# Patient Record
Sex: MALE | Race: WHITE | Employment: FULL TIME | URBAN - METROPOLITAN AREA
[De-identification: names, ages, dates, MRNs, and addresses within clinical notes are randomized per-mention and may not be internally consistent; named-entity substitution may affect disease eponyms.]

---

## 2024-03-20 ENCOUNTER — HOSPITAL ENCOUNTER (EMERGENCY)
Facility: HOSPITAL | Age: 29
Discharge: HOME | End: 2024-03-20

## 2024-03-20 VITALS
SYSTOLIC BLOOD PRESSURE: 149 MMHG | OXYGEN SATURATION: 94 % | HEART RATE: 84 BPM | RESPIRATION RATE: 16 BRPM | BODY MASS INDEX: 31.07 KG/M2 | HEIGHT: 68 IN | WEIGHT: 205 LBS | TEMPERATURE: 98.1 F | DIASTOLIC BLOOD PRESSURE: 92 MMHG

## 2024-03-20 DIAGNOSIS — E11.9 DIABETES MELLITUS (HCC): Primary | ICD-10-CM

## 2024-03-20 DIAGNOSIS — R89.9 ABNORMAL LABORATORY TEST RESULT: ICD-10-CM

## 2024-03-20 DIAGNOSIS — Z91.148 NON COMPLIANCE W MEDICATION REGIMEN: ICD-10-CM

## 2024-03-20 LAB — GLUCOSE SERPL-MCNC: 345 MG/DL (ref 65–140)

## 2024-03-20 PROCEDURE — 99284 EMERGENCY DEPT VISIT MOD MDM: CPT | Performed by: PHYSICIAN ASSISTANT

## 2024-03-20 PROCEDURE — 99283 EMERGENCY DEPT VISIT LOW MDM: CPT

## 2024-03-20 PROCEDURE — 82948 REAGENT STRIP/BLOOD GLUCOSE: CPT

## 2024-03-20 NOTE — ED PROVIDER NOTES
History  Chief Complaint   Patient presents with    Abnormal Lab     Patient here for abnormal labs.  Patient is scheduled for knee surgery on April 11th and had pre surgery testing done.  Patient was called by facility and informed to go to ER for elevated WBC count, elevated Hgb A1C, elevated AST and ALT levels along with elevated glucose.      28-year-old male with past medical history significant for diabetes presents to the emergency department today with chief complaint of abnormal outpatient lab readings.  Patient reports he is scheduled for knee surgery on April 11.  He had labs performed as part of his outpatient presurgical testing.  States he received a call instructing him to go to the emergency department for elevated white blood cell count, liver function tests and glucose.  Patient reports no chest pain, shortness of breath, abdominal pain, nausea vomiting diarrhea or syncope.  He denies fevers.  He denies polyuria or polydipsia.  He states he feels well, however came in today because he was instructed to do so for further evaluation of his abnormal outpatient labs.  He does endorse that he has not been compliant with his oral diabetes medications for multiple months.  He does endorse not following a diabetic diet.       History provided by:  Patient   used: No        None       Past Medical History:   Diagnosis Date    Diabetes (HCC)        Past Surgical History:   Procedure Laterality Date    KNEE SURGERY      left    SHOULDER SURGERY      left       History reviewed. No pertinent family history.  I have reviewed and agree with the history as documented.    E-Cigarette/Vaping    E-Cigarette Use Current Every Day User      E-Cigarette/Vaping Substances    Flavoring Yes      Social History     Tobacco Use    Smoking status: Never    Smokeless tobacco: Never   Vaping Use    Vaping status: Every Day    Substances: Flavoring   Substance Use Topics    Alcohol use: Never    Drug use:  Yes     Types: Marijuana       Review of Systems   Constitutional:  Negative for fever.   Respiratory:  Negative for chest tightness, shortness of breath and stridor.    Cardiovascular:  Negative for chest pain.   Musculoskeletal:  Negative for gait problem.   Skin:  Negative for rash.   Neurological:  Negative for seizures, syncope, facial asymmetry and numbness.   All other systems reviewed and are negative.      Physical Exam  Physical Exam  Vitals and nursing note reviewed.   Constitutional:       General: He is not in acute distress.     Appearance: Normal appearance.   HENT:      Head: Normocephalic and atraumatic.      Right Ear: External ear normal.      Left Ear: External ear normal.      Nose: Nose normal.   Eyes:      General: No scleral icterus.        Right eye: No discharge.         Left eye: No discharge.   Cardiovascular:      Rate and Rhythm: Normal rate.      Pulses: Normal pulses.   Pulmonary:      Effort: Pulmonary effort is normal.      Breath sounds: Normal breath sounds.   Musculoskeletal:         General: No tenderness, deformity or signs of injury.      Cervical back: Normal range of motion and neck supple.   Skin:     General: Skin is dry.      Coloration: Skin is not jaundiced.      Findings: No erythema or rash.   Neurological:      General: No focal deficit present.      Mental Status: He is alert and oriented to person, place, and time. Mental status is at baseline.      Motor: No weakness.      Gait: Gait normal.   Psychiatric:         Mood and Affect: Mood normal.         Behavior: Behavior normal.         Thought Content: Thought content normal.         Vital Signs  ED Triage Vitals [03/20/24 1014]   Temperature Pulse Respirations Blood Pressure SpO2   98.1 °F (36.7 °C) (!) 114 16 149/92 94 %      Temp Source Heart Rate Source Patient Position - Orthostatic VS BP Location FiO2 (%)   Oral Monitor Sitting Left arm --      Pain Score       --           Vitals:    03/20/24 1014 03/20/24  1102   BP: 149/92    Pulse: (!) 114 84   Patient Position - Orthostatic VS: Sitting          Visual Acuity      ED Medications  Medications - No data to display    Diagnostic Studies  Results Reviewed       Procedure Component Value Units Date/Time    Fingerstick Glucose (POCT) [[id number]]  (Abnormal) Collected: 03/20/24 1048    Lab Status: Final result Specimen: Blood Updated: 03/20/24 1050     POC Glucose 345 mg/dl                    No orders to display              Procedures  Procedures         ED Course                                             Medical Decision Making  MDM:     Based on my history and physical examination, I considered the following life or limb threatening disease(s) in my Differential Diagnosis: Suspect medication noncompliance causing abnormal labs, consider DKA, HHS, low suspicion for infection, ACS, renal failure    Patient has been medically screened for potential limb- or life-threatening conditions that may lead to permanent organ injury or dysfunctionan. Initial history and clinical exam findings raise concern for an acute emergent process.  Patient has no indication for advanced imaging.     Based on my history and clinical evaluation I will order the appropriate diagnostic testing to narrow my differential diagnosis and arrive at a final diagnosis. I have reviewed the patient's vital signs, nursing notes, and other relevant ancillary testing/information. I have had a detailed discussion with the patient regarding the historical points, examination findings, and any diagnostic results    Final Assessment (see ED course for additional MDM): Uncontrolled type 2 diabetes causing hyperglycemia secondary to medication noncompliance.  Abnormal outpatient labs including mild transaminitis without obstructive pattern.  Elevated white blood cell count is nonspecific and likely related to elevated glucose levels.  Patient is asymptomatic, reports no symptoms and has a normal clinical exam.   No indication for additional workup.  Discussed importance of glucose control to avoid short and long-term adverse effects including cardiac disease, renal disease, infections and other diabetic complications.  Vital signs stable.  Heart rate initially elevated at 114, improved to 84 on recheck.  Instructed f/u with pcp and endocrinologist for further treatment.  Instructed to restart metformin for better diabetes control.  Stable for discharge and outpatient follow-up.  Return to ED precautions given      Risk  Prescription drug management.             Disposition  Final diagnoses:   Diabetes mellitus (HCC)   Abnormal laboratory test result   Non compliance w medication regimen     Time reflects when diagnosis was documented in both MDM as applicable and the Disposition within this note       Time User Action Codes Description Comment    3/20/2024 10:47 AM Luiz Villalta [E11.9] Diabetes mellitus (HCC)     3/20/2024 10:47 AM Luiz Villalta [R89.9] Abnormal laboratory test result     3/20/2024 10:47 AM Luiz Villalta [Z91.148] Non compliance w medication regimen           ED Disposition       ED Disposition   Discharge    Condition   Stable    Date/Time   Wed Mar 20, 2024 10:46 AM    Comment   Jorge Benoit discharge to home/self care.                   Follow-up Information       Follow up With Specialties Details Why Contact Info Additional Information    [facility] Emergency Department Emergency Medicine Go to  If symptoms worsen [address] [facility] [address] [phone] [facility] Emergency Department, [address], [place], [address]    Octavio Dey MD Family Medicine Call in 1 week for further evaluation of symptoms [address]  [facility] [address] [phone]       [facility] Call in 1 week for further evaluation and management of diabetes  [address]  [facility] [address]  [phone] [facility] [address], [address], [place] [address], [phone]            Discharge Medication List as of 3/20/2024 10:48 AM        START taking these medications    Details   metFORMIN (GLUCOPHAGE) 1000 MG tablet Take 1 tablet (1,000 mg total) by mouth 2 (two) times a day with meals, Starting Wed 3/20/2024, Until Fri 4/19/2024, Normal             No discharge procedures on file.    PDMP Review       None            ED Provider  Electronically Signed by             Luiz Villalta PA-C  03/23/24 1550

## 2024-10-08 ENCOUNTER — APPOINTMENT (EMERGENCY)
Dept: RADIOLOGY | Facility: HOSPITAL | Age: 29
End: 2024-10-08

## 2024-10-08 ENCOUNTER — HOSPITAL ENCOUNTER (EMERGENCY)
Facility: HOSPITAL | Age: 29
Discharge: HOME | End: 2024-10-08
Attending: EMERGENCY MEDICINE

## 2024-10-08 VITALS
HEART RATE: 101 BPM | BODY MASS INDEX: 32.08 KG/M2 | RESPIRATION RATE: 20 BRPM | WEIGHT: 211.64 LBS | SYSTOLIC BLOOD PRESSURE: 146 MMHG | HEIGHT: 68 IN | OXYGEN SATURATION: 98 % | DIASTOLIC BLOOD PRESSURE: 82 MMHG | TEMPERATURE: 97.9 F

## 2024-10-08 DIAGNOSIS — R05.1 ACUTE COUGH: ICD-10-CM

## 2024-10-08 DIAGNOSIS — J20.9 ACUTE BRONCHITIS: Primary | ICD-10-CM

## 2024-10-08 DIAGNOSIS — R06.2 WHEEZING: ICD-10-CM

## 2024-10-08 DIAGNOSIS — R06.02 SOB (SHORTNESS OF BREATH): ICD-10-CM

## 2024-10-08 LAB
FLUAV AG UPPER RESP QL IA.RAPID: NEGATIVE
FLUBV AG UPPER RESP QL IA.RAPID: NEGATIVE
SARS-COV+SARS-COV-2 AG RESP QL IA.RAPID: NEGATIVE

## 2024-10-08 PROCEDURE — 99285 EMERGENCY DEPT VISIT HI MDM: CPT

## 2024-10-08 PROCEDURE — 87811 SARS-COV-2 COVID19 W/OPTIC: CPT | Performed by: EMERGENCY MEDICINE

## 2024-10-08 PROCEDURE — 87804 INFLUENZA ASSAY W/OPTIC: CPT | Performed by: EMERGENCY MEDICINE

## 2024-10-08 PROCEDURE — 71045 X-RAY EXAM CHEST 1 VIEW: CPT

## 2024-10-08 PROCEDURE — 99284 EMERGENCY DEPT VISIT MOD MDM: CPT | Performed by: EMERGENCY MEDICINE

## 2024-10-08 PROCEDURE — 93005 ELECTROCARDIOGRAM TRACING: CPT

## 2024-10-08 RX ORDER — PREDNISONE 20 MG/1
60 TABLET ORAL ONCE
Status: COMPLETED | OUTPATIENT
Start: 2024-10-08 | End: 2024-10-08

## 2024-10-08 RX ORDER — PREDNISONE 20 MG/1
60 TABLET ORAL DAILY
Qty: 9 TABLET | Refills: 0 | Status: SHIPPED | OUTPATIENT
Start: 2024-10-10 | End: 2024-10-13

## 2024-10-08 RX ORDER — ALBUTEROL SULFATE 0.83 MG/ML
5 SOLUTION RESPIRATORY (INHALATION) ONCE
Status: COMPLETED | OUTPATIENT
Start: 2024-10-08 | End: 2024-10-08

## 2024-10-08 RX ORDER — ALBUTEROL SULFATE 90 UG/1
2 INHALANT RESPIRATORY (INHALATION) EVERY 6 HOURS PRN
Qty: 18 G | Refills: 0 | Status: SHIPPED | OUTPATIENT
Start: 2024-10-08

## 2024-10-08 RX ADMIN — ALBUTEROL SULFATE 5 MG: 2.5 SOLUTION RESPIRATORY (INHALATION) at 18:11

## 2024-10-08 RX ADMIN — PREDNISONE 60 MG: 20 TABLET ORAL at 18:11

## 2024-10-08 RX ADMIN — IPRATROPIUM BROMIDE 0.5 MG: 0.5 SOLUTION RESPIRATORY (INHALATION) at 18:11

## 2024-10-08 NOTE — ED PROVIDER NOTES
Final diagnoses:   Acute bronchitis   SOB (shortness of breath)   Wheezing   Acute cough     ED Disposition       ED Disposition   Discharge    Condition   Stable    Date/Time   Tue Oct 8, 2024  6:50 PM    Comment   Jorge Benoit discharge to home/self care.                   Assessment & Plan       Medical Decision Making  29-year-old male history of diabetes presenting with viral syndrome.  Suspect likely bronchitis.  Given breathing treatment and steroids with significant improvement.  Chest x-ray interpreted by me without significant acute process.  Prescription sent to pharmacy. Discussed results and recommendations. Advised follow up PCP. Medication recommendations. Given instructions and return precautions. Patient/family at bedside acknowledged understanding of all written and verbal instructions and return precautions. Discharged.     Amount and/or Complexity of Data Reviewed  Labs: ordered.  Radiology: ordered.    Risk  Prescription drug management.             Medications   albuterol inhalation solution 5 mg (5 mg Nebulization Given 10/8/24 1811)   ipratropium (ATROVENT) 0.02 % inhalation solution 0.5 mg (0.5 mg Nebulization Given 10/8/24 1811)   predniSONE tablet 60 mg (60 mg Oral Given 10/8/24 1811)       ED Risk Strat Scores                           SBIRT 22yo+      Flowsheet Row Most Recent Value   Initial Alcohol Screen: US AUDIT-C     1. How often do you have a drink containing alcohol? 0 Filed at: 10/08/2024 1800   2. How many drinks containing alcohol do you have on a typical day you are drinking?  0 Filed at: 10/08/2024 1800   3a. Male UNDER 65: How often do you have five or more drinks on one occasion? 0 Filed at: 10/08/2024 1800   3b. FEMALE Any Age, or MALE 65+: How often do you have 4 or more drinks on one occassion? 0 Filed at: 10/08/2024 1800   Audit-C Score 0 Filed at: 10/08/2024 1800   JOSE EDUARDO: How many times in the past year have you...    Used an illegal drug or used a  prescription medication for non-medical reasons? Never Filed at: 10/08/2024 1800                            History of Present Illness       Chief Complaint   Patient presents with    Shortness of Breath     SOB x1 week, worse at night, coughing. Reports recent viral illness. Pt reports being diabetic with controlled sugars       Past Medical History:   Diagnosis Date    Diabetes (HCC)     Diabetes mellitus (HCC)       Past Surgical History:   Procedure Laterality Date    KNEE SURGERY      left    SHOULDER SURGERY      left      History reviewed. No pertinent family history.   Social History     Tobacco Use    Smoking status: Never    Smokeless tobacco: Never   Vaping Use    Vaping status: Every Day    Substances: Nicotine, THC, Flavoring   Substance Use Topics    Alcohol use: Never    Drug use: Yes     Types: Marijuana      E-Cigarette/Vaping    E-Cigarette Use Current Every Day User       E-Cigarette/Vaping Substances    Nicotine Yes     THC Yes     Flavoring Yes       I have reviewed and agree with the history as documented.     29-year-old male history of diabetes presenting with viral syndrome.  Patient reports 1 week of symptoms including congestion, runny nose, nonproductive cough.  Patient here due to persistent cough and shortness of breath wheezing.  Denies any chest pain.  Denies any dental pain nausea vomit diarrhea.  Denies any other complaints.  Chart reviewed.    Past Medical History:  No date: Diabetes (HCC)  No date: Diabetes mellitus (HCC)  Family History: non-contributory  Social History          Review of Systems   Constitutional:  Negative for appetite change, chills, diaphoresis, fever and unexpected weight change.   HENT:  Positive for congestion and rhinorrhea.    Eyes:  Negative for photophobia and visual disturbance.   Respiratory:  Positive for cough, shortness of breath and wheezing. Negative for chest tightness.    Cardiovascular:  Negative for chest pain, palpitations and leg swelling.    Gastrointestinal:  Negative for abdominal distention, abdominal pain, blood in stool, constipation, diarrhea, nausea and vomiting.   Genitourinary:  Negative for dysuria and hematuria.   Musculoskeletal:  Negative for back pain, joint swelling, neck pain and neck stiffness.   Skin:  Negative for color change, pallor, rash and wound.   Neurological:  Negative for dizziness, syncope, weakness, light-headedness and headaches.   Psychiatric/Behavioral:  Negative for agitation.    All other systems reviewed and are negative.          Objective       ED Triage Vitals [10/08/24 1758]   Temperature Pulse Blood Pressure Respirations SpO2 Patient Position - Orthostatic VS   97.9 °F (36.6 °C) (!) 109 147/92 19 96 % Sitting      Temp Source Heart Rate Source BP Location FiO2 (%) Pain Score    Oral Monitor Left arm -- --      Vitals      Date and Time Temp Pulse SpO2 Resp BP Pain Score FACES Pain Rating User   10/08/24 1845 -- 101 98 % 20 146/82 -- -- AM   10/08/24 1815 -- 107 98 % 14 161/83 -- -- AM   10/08/24 1758 97.9 °F (36.6 °C) 109 96 % 19 147/92 -- -- AB            Physical Exam  Vitals and nursing note reviewed.   Constitutional:       General: He is not in acute distress.     Appearance: Normal appearance. He is well-developed. He is not ill-appearing, toxic-appearing or diaphoretic.   HENT:      Head: Normocephalic and atraumatic.      Nose: Nose normal. No congestion or rhinorrhea.      Mouth/Throat:      Mouth: Mucous membranes are moist.      Pharynx: Oropharynx is clear. No oropharyngeal exudate or posterior oropharyngeal erythema.   Eyes:      General: No scleral icterus.        Right eye: No discharge.         Left eye: No discharge.      Extraocular Movements: Extraocular movements intact.      Conjunctiva/sclera: Conjunctivae normal.      Pupils: Pupils are equal, round, and reactive to light.   Neck:      Vascular: No JVD.      Trachea: No tracheal deviation.      Comments: Supple. Normal range of motion.    Cardiovascular:      Rate and Rhythm: Normal rate and regular rhythm.      Heart sounds: Normal heart sounds. No murmur heard.     No friction rub. No gallop.      Comments: Normal rate and regular rhythm  Pulmonary:      Effort: Pulmonary effort is normal. No respiratory distress.      Breath sounds: No stridor. Examination of the right-upper field reveals wheezing. Examination of the left-upper field reveals wheezing. Examination of the right-middle field reveals wheezing. Examination of the left-middle field reveals wheezing. Examination of the right-lower field reveals wheezing. Examination of the left-lower field reveals wheezing. Wheezing present. No rales.      Comments: Diffuse expiratory wheezing  Chest:      Chest wall: No tenderness.   Abdominal:      General: Bowel sounds are normal. There is no distension.      Palpations: Abdomen is soft.      Tenderness: There is no abdominal tenderness. There is no right CVA tenderness, left CVA tenderness, guarding or rebound.      Comments: Soft, nontender, nondistended.  Normal bowel sounds throughout   Musculoskeletal:         General: No swelling, tenderness, deformity or signs of injury. Normal range of motion.      Cervical back: Normal range of motion and neck supple. No rigidity. No muscular tenderness.      Right lower leg: No edema.      Left lower leg: No edema.   Lymphadenopathy:      Cervical: No cervical adenopathy.   Skin:     General: Skin is warm and dry.      Coloration: Skin is not pale.      Findings: No erythema or rash.   Neurological:      General: No focal deficit present.      Mental Status: He is alert. Mental status is at baseline.      Sensory: No sensory deficit.      Motor: No weakness or abnormal muscle tone.      Coordination: Coordination normal.      Gait: Gait normal.      Comments: Alert.  Strength and sensation grossly intact.  Ambulatory without difficulty at baseline.    Psychiatric:         Behavior: Behavior normal.          Thought Content: Thought content normal.         Results Reviewed       Procedure Component Value Units Date/Time    FLU/COVID Rapid Antigen (30 min. TAT) - Preferred screening test in ED [[id number]]  (Normal) Collected: 10/08/24 1802    Lab Status: Final result Specimen: Nares from Nose Updated: 10/08/24 1822     SARS COV Rapid Antigen Negative     Influenza A Rapid Antigen Negative     Influenza B Rapid Antigen Negative    Narrative:      This test has been performed using the [employer] 2 FLU+SARS Antigen test under the Emergency Use Authorization (EUA). This test has been validated by the  and verified by the performing laboratory. The Paulina uses lateral flow immunofluorescent sandwich assay to detect SARS-COV, Influenza A and Influenza B Antigen.     The [employer] Paulina 2 SARS Antigen test does not differentiate between SARS-CoV and SARS-CoV-2.     Negative results are presumptive and may be confirmed with a molecular assay, if necessary, for patient management. Negative results do not rule out SARS-CoV-2 or influenza infection and should not be used as the sole basis for treatment or patient management decisions. A negative test result may occur if the level of antigen in a sample is below the limit of detection of this test.     Positive results are indicative of the presence of viral antigens, but do not rule out bacterial infection or co-infection with other viruses.     All test results should be used as an adjunct to clinical observations and other information available to the provider.    FOR PEDIATRIC PATIENTS - copy/paste COVID Guidelines URL to browser: [email or web address]            XR chest 1 view portable    (Results Pending)       Procedures    ED Medication and Procedure Management   Prior to Admission Medications   Prescriptions Last Dose Informant Patient Reported? Taking?   metFORMIN (GLUCOPHAGE) 1000 MG tablet   No No   Sig:  Take 1 tablet (1,000 mg total) by mouth 2 (two) times a day with meals      Facility-Administered Medications: None     Discharge Medication List as of 10/8/2024  6:51 PM        START taking these medications    Details   albuterol (ProAir HFA) 90 mcg/act inhaler Inhale 2 puffs every 6 (six) hours as needed for shortness of breath or wheezing, Starting Tue 10/8/2024, Normal      predniSONE 20 mg tablet Take 3 tablets (60 mg total) by mouth daily for 3 days Do not start before October 10, 2024., Starting Thu 10/10/2024, Until Sun 10/13/2024, Normal           CONTINUE these medications which have NOT CHANGED    Details   metFORMIN (GLUCOPHAGE) 1000 MG tablet Take 1 tablet (1,000 mg total) by mouth 2 (two) times a day with meals, Starting Wed 3/20/2024, Until Fri 4/19/2024, Normal           No discharge procedures on file.  ED SEPSIS DOCUMENTATION   Time reflects when diagnosis was documented in both MDM as applicable and the Disposition within this note       Time User Action Codes Description Comment    10/8/2024  6:51 PM Jt Robertson Add [J20.9] Acute bronchitis     10/8/2024  6:51 PM Jt Robertson Add [R06.02] SOB (shortness of breath)     10/8/2024  6:51 PM Jt Robertson Add [R06.2] Wheezing     10/8/2024  6:51 PM Jt Robertson Add [R05.1] Acute cough                  Jt Robertson MD  10/08/24 1957

## 2024-10-09 LAB
ATRIAL RATE: 120 BPM
P AXIS: 67 DEGREES
PR INTERVAL: 124 MS
QRS AXIS: 77 DEGREES
QRSD INTERVAL: 82 MS
QT INTERVAL: 318 MS
QTC INTERVAL: 449 MS
T WAVE AXIS: 65 DEGREES
VENTRICULAR RATE: 120 BPM

## 2024-10-09 PROCEDURE — 93010 ELECTROCARDIOGRAM REPORT: CPT | Performed by: INTERNAL MEDICINE

## 2025-01-20 ENCOUNTER — HOSPITAL ENCOUNTER (EMERGENCY)
Facility: HOSPITAL | Age: 30
Discharge: HOME | End: 2025-01-20
Attending: EMERGENCY MEDICINE

## 2025-01-20 ENCOUNTER — APPOINTMENT (EMERGENCY)
Dept: RADIOLOGY | Facility: HOSPITAL | Age: 30
End: 2025-01-20

## 2025-01-20 VITALS
OXYGEN SATURATION: 96 % | SYSTOLIC BLOOD PRESSURE: 143 MMHG | DIASTOLIC BLOOD PRESSURE: 88 MMHG | WEIGHT: 216.05 LBS | HEART RATE: 105 BPM | TEMPERATURE: 97.6 F | RESPIRATION RATE: 20 BRPM | BODY MASS INDEX: 32.85 KG/M2

## 2025-01-20 DIAGNOSIS — J18.9 PNEUMONIA: Primary | ICD-10-CM

## 2025-01-20 LAB
ALBUMIN SERPL BCG-MCNC: 4.3 G/DL (ref 3.5–5)
ALP SERPL-CCNC: 155 U/L (ref 34–104)
ALT SERPL W P-5'-P-CCNC: 37 U/L (ref 7–52)
ANION GAP SERPL CALCULATED.3IONS-SCNC: 9 MMOL/L (ref 4–13)
AST SERPL W P-5'-P-CCNC: 20 U/L (ref 13–39)
BASOPHILS # BLD AUTO: 0.11 THOUSANDS/ΜL (ref 0–0.1)
BASOPHILS NFR BLD AUTO: 1 % (ref 0–1)
BILIRUB SERPL-MCNC: 0.45 MG/DL (ref 0.2–1)
BUN SERPL-MCNC: 12 MG/DL (ref 5–25)
CALCIUM SERPL-MCNC: 9.1 MG/DL (ref 8.4–10.2)
CARDIAC TROPONIN I PNL SERPL HS: <2 NG/L (ref ?–50)
CHLORIDE SERPL-SCNC: 99 MMOL/L (ref 96–108)
CO2 SERPL-SCNC: 26 MMOL/L (ref 21–32)
CREAT SERPL-MCNC: 0.8 MG/DL (ref 0.6–1.3)
D DIMER PPP FEU-MCNC: <0.27 UG/ML FEU
EOSINOPHIL # BLD AUTO: 0.88 THOUSAND/ΜL (ref 0–0.61)
EOSINOPHIL NFR BLD AUTO: 5 % (ref 0–6)
ERYTHROCYTE [DISTWIDTH] IN BLOOD BY AUTOMATED COUNT: 14.1 % (ref 11.6–15.1)
FLUAV AG UPPER RESP QL IA.RAPID: NEGATIVE
FLUBV AG UPPER RESP QL IA.RAPID: NEGATIVE
GFR SERPL CREATININE-BSD FRML MDRD: 120 ML/MIN/1.73SQ M
GLUCOSE SERPL-MCNC: 324 MG/DL (ref 65–140)
HCT VFR BLD AUTO: 43.8 % (ref 36.5–49.3)
HGB BLD-MCNC: 14.1 G/DL (ref 12–17)
IMM GRANULOCYTES # BLD AUTO: 0.1 THOUSAND/UL (ref 0–0.2)
IMM GRANULOCYTES NFR BLD AUTO: 1 % (ref 0–2)
LYMPHOCYTES # BLD AUTO: 5.77 THOUSANDS/ΜL (ref 0.6–4.47)
LYMPHOCYTES NFR BLD AUTO: 32 % (ref 14–44)
MCH RBC QN AUTO: 24.5 PG (ref 26.8–34.3)
MCHC RBC AUTO-ENTMCNC: 32.2 G/DL (ref 31.4–37.4)
MCV RBC AUTO: 76 FL (ref 82–98)
MONOCYTES # BLD AUTO: 0.96 THOUSAND/ΜL (ref 0.17–1.22)
MONOCYTES NFR BLD AUTO: 5 % (ref 4–12)
NEUTROPHILS # BLD AUTO: 10.36 THOUSANDS/ΜL (ref 1.85–7.62)
NEUTS SEG NFR BLD AUTO: 56 % (ref 43–75)
NRBC BLD AUTO-RTO: 0 /100 WBCS
PLATELET # BLD AUTO: 451 THOUSANDS/UL (ref 149–390)
PMV BLD AUTO: 9.5 FL (ref 8.9–12.7)
POTASSIUM SERPL-SCNC: 3.6 MMOL/L (ref 3.5–5.3)
PROT SERPL-MCNC: 7.7 G/DL (ref 6.4–8.4)
RBC # BLD AUTO: 5.76 MILLION/UL (ref 3.88–5.62)
SARS-COV+SARS-COV-2 AG RESP QL IA.RAPID: NEGATIVE
SODIUM SERPL-SCNC: 134 MMOL/L (ref 135–147)
WBC # BLD AUTO: 18.18 THOUSAND/UL (ref 4.31–10.16)

## 2025-01-20 PROCEDURE — 87811 SARS-COV-2 COVID19 W/OPTIC: CPT | Performed by: EMERGENCY MEDICINE

## 2025-01-20 PROCEDURE — 71046 X-RAY EXAM CHEST 2 VIEWS: CPT

## 2025-01-20 PROCEDURE — 94640 AIRWAY INHALATION TREATMENT: CPT

## 2025-01-20 PROCEDURE — 87804 INFLUENZA ASSAY W/OPTIC: CPT | Performed by: EMERGENCY MEDICINE

## 2025-01-20 PROCEDURE — 36415 COLL VENOUS BLD VENIPUNCTURE: CPT | Performed by: EMERGENCY MEDICINE

## 2025-01-20 PROCEDURE — 85025 COMPLETE CBC W/AUTO DIFF WBC: CPT | Performed by: EMERGENCY MEDICINE

## 2025-01-20 PROCEDURE — 93005 ELECTROCARDIOGRAM TRACING: CPT

## 2025-01-20 PROCEDURE — 80053 COMPREHEN METABOLIC PANEL: CPT | Performed by: EMERGENCY MEDICINE

## 2025-01-20 PROCEDURE — 99285 EMERGENCY DEPT VISIT HI MDM: CPT | Performed by: EMERGENCY MEDICINE

## 2025-01-20 PROCEDURE — 99285 EMERGENCY DEPT VISIT HI MDM: CPT

## 2025-01-20 PROCEDURE — 84484 ASSAY OF TROPONIN QUANT: CPT | Performed by: EMERGENCY MEDICINE

## 2025-01-20 PROCEDURE — 85379 FIBRIN DEGRADATION QUANT: CPT | Performed by: EMERGENCY MEDICINE

## 2025-01-20 RX ORDER — ALBUTEROL SULFATE 90 UG/1
2 INHALANT RESPIRATORY (INHALATION) ONCE
Status: COMPLETED | OUTPATIENT
Start: 2025-01-20 | End: 2025-01-20

## 2025-01-20 RX ORDER — IPRATROPIUM BROMIDE AND ALBUTEROL SULFATE 2.5; .5 MG/3ML; MG/3ML
3 SOLUTION RESPIRATORY (INHALATION) ONCE
Status: COMPLETED | OUTPATIENT
Start: 2025-01-20 | End: 2025-01-20

## 2025-01-20 RX ORDER — AMOXICILLIN 250 MG/1
1000 CAPSULE ORAL ONCE
Status: COMPLETED | OUTPATIENT
Start: 2025-01-20 | End: 2025-01-20

## 2025-01-20 RX ORDER — AMOXICILLIN 500 MG/1
1000 CAPSULE ORAL EVERY 8 HOURS SCHEDULED
Qty: 30 CAPSULE | Refills: 0 | Status: SHIPPED | OUTPATIENT
Start: 2025-01-20 | End: 2025-01-25

## 2025-01-20 RX ADMIN — ALBUTEROL SULFATE 2 PUFF: 90 AEROSOL, METERED RESPIRATORY (INHALATION) at 22:14

## 2025-01-20 RX ADMIN — AMOXICILLIN 1000 MG: 250 CAPSULE ORAL at 22:56

## 2025-01-20 RX ADMIN — IPRATROPIUM BROMIDE AND ALBUTEROL SULFATE 3 ML: 2.5; .5 SOLUTION RESPIRATORY (INHALATION) at 22:14

## 2025-01-21 LAB
ATRIAL RATE: 97 BPM
P AXIS: 81 DEGREES
PR INTERVAL: 130 MS
QRS AXIS: 86 DEGREES
QRSD INTERVAL: 84 MS
QT INTERVAL: 342 MS
QTC INTERVAL: 434 MS
T WAVE AXIS: 60 DEGREES
VENTRICULAR RATE: 97 BPM

## 2025-01-21 PROCEDURE — 93010 ELECTROCARDIOGRAM REPORT: CPT | Performed by: INTERNAL MEDICINE

## 2025-01-21 NOTE — ED PROVIDER NOTES
Time reflects when diagnosis was documented in both MDM as applicable and the Disposition within this note       Time User Action Codes Description Comment    1/20/2025 10:45 PM Brook Doherty [J18.9] Pneumonia           ED Disposition       ED Disposition   Discharge    Condition   Stable    Date/Time   Mon Jan 20, 2025 10:45 PM    Comment   Jorge Benoit discharge to home/self care.                   Assessment & Plan       Medical Decision Making  Patient is a 29-year-old male past medical history diabetes presenting with shortness of breath.  Patient is well-appearing at bedside with stable vitals and in no acute distress.  He has mild expiratory wheezing in the right base, with no signs of respiratory distress, no lower extreme edema, equal pulses and no other significant physical exam findings.  Will give albuterol, obtain chest x-ray to assess for pneumonia, pneumothorax, pulmonary edema, labs to assess for electrolyte abnormalities, anemia, MATEUSZ, D-dimer to assess for pulmonary embolism and continue to monitor.    Amount and/or Complexity of Data Reviewed  Labs: ordered.  Radiology: ordered and independent interpretation performed.    Risk  Prescription drug management.        ED Course as of 01/24/25 1722   Mon Jan 20, 2025   2244 Patient with right lower lobe pneumonia, will give antibiotics, will give outpatient albuterol inhaler and have discussed return precautions and patient states he understands.       Medications   albuterol (PROVENTIL HFA,VENTOLIN HFA) inhaler 2 puff (2 puffs Inhalation Given 1/20/25 2214)   ipratropium-albuterol (DUO-NEB) 0.5-2.5 mg/3 mL inhalation solution 3 mL (3 mL Nebulization Given 1/20/25 2214)   amoxicillin (AMOXIL) capsule 1,000 mg (1,000 mg Oral Given 1/20/25 2256)       ED Risk Strat Scores                          SBIRT 22yo+      Flowsheet Row Most Recent Value   Initial Alcohol Screen: US AUDIT-C     1. How often do you have a drink containing  alcohol? 0 Filed at: 01/20/2025 2108   2. How many drinks containing alcohol do you have on a typical day you are drinking?  0 Filed at: 01/20/2025 2108   3a. Male UNDER 65: How often do you have five or more drinks on one occasion? 0 Filed at: 01/20/2025 2108   Audit-C Score 0 Filed at: 01/20/2025 2108   JOSE EDUARDO: How many times in the past year have you...    Used an illegal drug or used a prescription medication for non-medical reasons? Never Filed at: 01/20/2025 2108                            History of Present Illness       Chief Complaint   Patient presents with    Shortness of Breath     Pt c/o SOB since yesterday. Pt states that tonight it is worse. He was seen for same a couple months ago, was told to come back if he does not feel better, and states that he received an inhaler which provided relief and would like another to help. Denies Hx of asthma.        Past Medical History:   Diagnosis Date    Diabetes (HCC)     Diabetes mellitus (HCC)       Past Surgical History:   Procedure Laterality Date    KNEE SURGERY      left    SHOULDER SURGERY      left      History reviewed. No pertinent family history.   Social History     Tobacco Use    Smoking status: Never    Smokeless tobacco: Never   Vaping Use    Vaping status: Every Day    Substances: Nicotine, THC, Flavoring   Substance Use Topics    Alcohol use: Never    Drug use: Yes     Types: Marijuana      E-Cigarette/Vaping    E-Cigarette Use Current Every Day User       E-Cigarette/Vaping Substances    Nicotine Yes     THC Yes     Flavoring Yes       I have reviewed and agree with the history as documented.     Patient is a 29-year-old male past medical history of diabetes presenting with shortness of breath.  Patient notes shortness of breath for the last 1 to 2 days and states he had similar several months ago when he was diagnosed with bronchitis.  States he has a cough productive of green nonbloody sputum which is worse at night.  He states he was given an  inhaler several months ago and that has helped.  Notes 1 episode of vomiting but denies any chest pain, leg pain or swelling, nausea, rashes, vision changes, dysuria, dizziness, fevers.  Denies any history of blood clots, clotting disorders, cancer diagnosis, recent surgeries or immobilization or use of estrogen products.        Review of Systems   All other systems reviewed and are negative.          Objective       ED Triage Vitals [01/20/25 2104]   Temperature Pulse Blood Pressure Respirations SpO2 Patient Position - Orthostatic VS   97.6 °F (36.4 °C) 105 143/88 20 96 % Sitting      Temp Source Heart Rate Source BP Location FiO2 (%) Pain Score    Temporal Monitor Left arm -- --      Vitals      Date and Time Temp Pulse SpO2 Resp BP Pain Score FACES Pain Rating User   01/20/25 2104 97.6 °F (36.4 °C) 105 96 % 20 143/88 -- -- BS            Physical Exam  Vitals reviewed.   Constitutional:       General: He is not in acute distress.     Appearance: Normal appearance. He is not ill-appearing.   HENT:      Mouth/Throat:      Mouth: Mucous membranes are moist.   Eyes:      Conjunctiva/sclera: Conjunctivae normal.   Cardiovascular:      Rate and Rhythm: Normal rate and regular rhythm.      Pulses: Normal pulses.      Heart sounds: Normal heart sounds.   Pulmonary:      Effort: Pulmonary effort is normal.      Breath sounds: Examination of the right-lower field reveals wheezing. Wheezing present.      Comments: Mild expiratory wheezing in the lower lung fields bilaterally  Abdominal:      General: Abdomen is flat.      Palpations: Abdomen is soft.      Tenderness: There is no abdominal tenderness.   Musculoskeletal:         General: No swelling. Normal range of motion.      Cervical back: Neck supple.      Right lower leg: No tenderness. No edema.      Left lower leg: No tenderness. No edema.   Skin:     General: Skin is warm and dry.   Neurological:      General: No focal deficit present.      Mental Status: He is alert.    Psychiatric:         Mood and Affect: Mood normal.         Results Reviewed       Procedure Component Value Units Date/Time    HS Troponin 0hr (reflex protocol) [[id number]]  (Normal) Collected: 01/20/25 2211    Lab Status: Final result Specimen: Blood from Arm, Right Updated: 01/20/25 2240     hs TnI 0hr <2 ng/L     D-Dimer [[id number]]  (Normal) Collected: 01/20/25 2211    Lab Status: Final result Specimen: Blood from Arm, Right Updated: 01/20/25 2238     D-Dimer, Quant <0.27 ug/ml FEU     FLU/COVID Rapid Antigen (30 min. TAT) - Preferred screening test in ED [[id number]]  (Normal) Collected: 01/20/25 2211    Lab Status: Final result Specimen: Nares from Nose Updated: 01/20/25 2234     SARS COV Rapid Antigen Negative     Influenza A Rapid Antigen Negative     Influenza B Rapid Antigen Negative    Narrative:      This test has been performed using the [employer] 2 FLU+SARS Antigen test under the Emergency Use Authorization (EUA). This test has been validated by the  and verified by the performing laboratory. The Paul[employer] uses lateral flow immunofluorescent sandwich assay to detect SARS-COV, Influenza A and Influenza B Antigen.     The [employer] Paul[employer] 2 SARS Antigen test does not differentiate between SARS-CoV and SARS-CoV-2.     Negative results are presumptive and may be confirmed with a molecular assay, if necessary, for patient management. Negative results do not rule out SARS-CoV-2 or influenza infection and should not be used as the sole basis for treatment or patient management decisions. A negative test result may occur if the level of antigen in a sample is below the limit of detection of this test.     Positive results are indicative of the presence of viral antigens, but do not rule out bacterial infection or co-infection with other viruses.     All test results should be used as an adjunct to clinical observations and other information available to the provider.    FOR PEDIATRIC PATIENTS -  copy/paste COVID Guidelines URL to browser: [email or web address]    Comprehensive metabolic panel [[id number]]  (Abnormal) Collected: 01/20/25 2211    Lab Status: Final result Specimen: Blood from Arm, Right Updated: 01/20/25 2233     Sodium 134 mmol/L      Potassium 3.6 mmol/L      Chloride 99 mmol/L      CO2 26 mmol/L      ANION GAP 9 mmol/L      BUN 12 mg/dL      Creatinine 0.80 mg/dL      Glucose 324 mg/dL      Calcium 9.1 mg/dL      AST 20 U/L      ALT 37 U/L      Alkaline Phosphatase 155 U/L      Total Protein 7.7 g/dL      Albumin 4.3 g/dL      Total Bilirubin 0.45 mg/dL      eGFR 120 ml/min/1.73sq m     Narrative:      [employer] Kid[employer] guidelines for Chronic Kidney Disease (CKD):     Stage 1 with normal or high GFR (GFR > 90 mL/min/1.73 square meters)    Stage 2 Mild CKD (GFR = 60-89 mL/min/1.73 square meters)    Stage 3A Moderate CKD (GFR = 45-59 mL/min/1.73 square meters)    Stage 3B Moderate CKD (GFR = 30-44 mL/min/1.73 square meters)    Stage 4 Severe CKD (GFR = 15-29 mL/min/1.73 square meters)    Stage 5 End Stage CKD (GFR <15 mL/min/1.73 square meters)  Note: GFR calculation is accurate only with a steady state creatinine    CBC and differential [[id number]]  (Abnormal) Collected: 01/20/25 2211    Lab Status: Final result Specimen: Blood from Arm, Right Updated: 01/20/25 2222     WBC 18.18 Thousand/uL      RBC 5.76 Million/uL      Hemoglobin 14.1 g/dL      Hematocrit 43.8 %      MCV 76 fL      MCH 24.5 pg      MCHC 32.2 g/dL      RDW 14.1 %      MPV 9.5 fL      Platelets 451 Thousands/uL      nRBC 0 /100 WBCs      Segmented % 56 %      Immature Grans % 1 %      Lymphocytes % 32 %      Monocytes % 5 %      Eosinophils Relative 5 %      Basophils Relative 1 %      Absolute Neutrophils 10.36 Thousands/µL      Absolute Immature Grans 0.10 Thousand/uL      Absolute Lymphocytes 5.77 Thousands/µL      Absolute Monocytes 0.96 Thousand/µL       Eosinophils Absolute 0.88 Thousand/µL      Basophils Absolute 0.11 Thousands/µL             XR chest 2 views   ED Interpretation by Brook Doherty DO (01/20 2244)   Right lower lobe pneumonia      Final Interpretation by Jayjay Benjamin MD (01/21 0014)      Small focus of linear atelectasis versus infiltrate at the right lung base.            Findings concur with ED results as provided in PACS viewer/EPIC at the time of interpretation.      Workstation performed: [id number]             Procedures    ED Medication and Procedure Management   Prior to Admission Medications   Prescriptions Last Dose Informant Patient Reported? Taking?   albuterol (ProAir HFA) 90 mcg/act inhaler   No No   Sig: Inhale 2 puffs every 6 (six) hours as needed for shortness of breath or wheezing   metFORMIN (GLUCOPHAGE) 1000 MG tablet   No No   Sig: Take 1 tablet (1,000 mg total) by mouth 2 (two) times a day with meals      Facility-Administered Medications: None     Discharge Medication List as of 1/20/2025 10:47 PM        START taking these medications    Details   amoxicillin (AMOXIL) 500 mg capsule Take 2 capsules (1,000 mg total) by mouth every 8 (eight) hours for 5 days, Starting Mon 1/20/2025, Until Sat 1/25/2025, Normal           CONTINUE these medications which have NOT CHANGED    Details   albuterol (ProAir HFA) 90 mcg/act inhaler Inhale 2 puffs every 6 (six) hours as needed for shortness of breath or wheezing, Starting Tue 10/8/2024, Normal      metFORMIN (GLUCOPHAGE) 1000 MG tablet Take 1 tablet (1,000 mg total) by mouth 2 (two) times a day with meals, Starting Wed 3/20/2024, Until Fri 4/19/2024, Normal             ED SEPSIS DOCUMENTATION   Time reflects when diagnosis was documented in both MDM as applicable and the Disposition within this note       Time User Action Codes Description Comment    1/20/2025 10:45 PM Brook Doherty Add [J18.9] Pneumonia                  Brook Doherty DO  01/24/25 7235